# Patient Record
Sex: FEMALE | Race: BLACK OR AFRICAN AMERICAN | NOT HISPANIC OR LATINO | Employment: UNEMPLOYED | URBAN - METROPOLITAN AREA
[De-identification: names, ages, dates, MRNs, and addresses within clinical notes are randomized per-mention and may not be internally consistent; named-entity substitution may affect disease eponyms.]

---

## 2018-08-13 ENCOUNTER — HOSPITAL ENCOUNTER (EMERGENCY)
Facility: OTHER | Age: 23
Discharge: HOME | End: 2018-08-13
Attending: EMERGENCY MEDICINE
Payer: MEDICAID

## 2018-08-13 VITALS
DIASTOLIC BLOOD PRESSURE: 72 MMHG | TEMPERATURE: 99 F | HEART RATE: 59 BPM | OXYGEN SATURATION: 100 % | RESPIRATION RATE: 17 BRPM | BODY MASS INDEX: 29.88 KG/M2 | SYSTOLIC BLOOD PRESSURE: 119 MMHG | WEIGHT: 175 LBS | HEIGHT: 64 IN
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
## 2018-08-13 PROCEDURE — 99283 EMERGENCY DEPT VISIT LOW MDM: CPT

## 2018-08-13 NOTE — ED PROVIDER NOTES
"Encounter Date: 8/13/2018       History     Chief Complaint   Patient presents with    Insect Bite     pt states she thinks a spider bit her today on her right lower arm.  very small area noted no redness noted pt states since she was bit she feels "funny" inside  she just wants to make sure it was not a spider     23-year-old female with a past medical history presenting to the emergency department with complaints of insect bite to her right forearm.  She states that she did not see what bit her but was concerned that it might be a spider.  She denies any fever, chills, redness or warmth or pain. She states that she just had come get it checked out.      The history is provided by the patient.     Review of patient's allergies indicates:  No Known Allergies  History reviewed. No pertinent past medical history.  Past Surgical History:   Procedure Laterality Date    EYE SURGERY  2001     History reviewed. No pertinent family history.  Social History     Tobacco Use    Smoking status: Never Smoker   Substance Use Topics    Alcohol use: Yes     Comment: socially     Drug use: No     Review of Systems   Constitutional: Negative for chills and fever.   HENT: Negative for sore throat.    Respiratory: Negative for shortness of breath.    Cardiovascular: Negative for chest pain.   Gastrointestinal: Negative for nausea and vomiting.   Genitourinary: Negative for dysuria.   Musculoskeletal: Negative for back pain.   Skin: Negative for color change, pallor and rash.        Insect bite   Neurological: Negative for weakness.   Hematological: Does not bruise/bleed easily.       Physical Exam     Initial Vitals [08/13/18 1758]   BP Pulse Resp Temp SpO2   119/72 (!) 59 17 99.2 °F (37.3 °C) 100 %      MAP       --         Physical Exam    Nursing note and vitals reviewed.  Constitutional: Vital signs are normal. She appears well-developed and well-nourished.  Non-toxic appearance. No distress.   HENT:   Head: Normocephalic " and atraumatic.   Right Ear: External ear normal.   Left Ear: External ear normal.   Nose: Nose normal.   Eyes: Conjunctivae and lids are normal. No scleral icterus.   Neck: Phonation normal.   Abdominal: Normal appearance.   Musculoskeletal: Normal range of motion.   No obvious deformities, moving all extremities, normal gait   Neurological: She is alert and oriented to person, place, and time. She has normal strength. No sensory deficit.   Skin: Skin is warm, dry and intact. No lesion, no rash and no abscess noted. No erythema.   Small scab to the right forearm. No erythema, warmth, edema, induration or drainage. No lymphangitis. No TTP   Psychiatric: She has a normal mood and affect. Her speech is normal and behavior is normal. Judgment normal. Cognition and memory are normal.         ED Course   Procedures  Labs Reviewed - No data to display       Imaging Results    None          Medical Decision Making:   History:   Old Medical Records: I decided to obtain old medical records.  Initial Assessment:   23-year-old female with complaints consistent with insect bite to the right forearm.  Vital signs stable, afebrile, neurovascularly intact.  She is alert and healthy and nontoxic appearing.  She is in no apparent distress.  Exam documented above.  No evidence of serious bacterial infection.  This does not appear to be consistent with a spider bite.  ED Management:  Will discharge home with care instructions.  She is to follow up with primary care physician and was given information for [facility].  She is urged to return to emergency department for any worsening signs or symptoms. She states understanding agrees this plan.  This is the extent of patient's complaints today.  This note was created using [employer] dictation.  There may be typographical errors secondary to dictation.                        Clinical Impression:     1. Insect bite, initial encounter            Disposition:   Disposition:  Discharged  Condition: Stable                        Yael Tubbs PA-C  08/13/18 [id number]

## 2018-08-13 NOTE — ED TRIAGE NOTES
Pt presents to the ED with c/o insect bite to to right arm. Pt states that she think she was bit by a spider today. Pt states that she has tenderness to right arm with palpation. Pt rates pain as a 1/10. Pt denies fever and chills. Pt in NAD.